# Patient Record
Sex: MALE | Race: BLACK OR AFRICAN AMERICAN | Employment: FULL TIME | URBAN - METROPOLITAN AREA
[De-identification: names, ages, dates, MRNs, and addresses within clinical notes are randomized per-mention and may not be internally consistent; named-entity substitution may affect disease eponyms.]

---

## 2017-07-12 ENCOUNTER — OFFICE VISIT (OUTPATIENT)
Dept: PRIMARY CARE CLINIC | Age: 67
End: 2017-07-12

## 2017-07-12 VITALS
BODY MASS INDEX: 31.95 KG/M2 | RESPIRATION RATE: 18 BRPM | WEIGHT: 249 LBS | OXYGEN SATURATION: 96 % | HEART RATE: 81 BPM | DIASTOLIC BLOOD PRESSURE: 67 MMHG | TEMPERATURE: 98.9 F | HEIGHT: 74 IN | SYSTOLIC BLOOD PRESSURE: 107 MMHG

## 2017-07-12 DIAGNOSIS — E78.2 MIXED HYPERLIPIDEMIA: ICD-10-CM

## 2017-07-12 DIAGNOSIS — B35.3 TINEA PEDIS, UNSPECIFIED LATERALITY: ICD-10-CM

## 2017-07-12 DIAGNOSIS — R94.5 LIVER FUNCTION STUDY, ABNORMAL: ICD-10-CM

## 2017-07-12 DIAGNOSIS — D23.9 DYSPLASTIC NEVI: ICD-10-CM

## 2017-07-12 DIAGNOSIS — R35.0 URINARY FREQUENCY: ICD-10-CM

## 2017-07-12 DIAGNOSIS — I10 ESSENTIAL HYPERTENSION: ICD-10-CM

## 2017-07-12 DIAGNOSIS — E11.8 TYPE 2 DIABETES MELLITUS WITH COMPLICATION, WITHOUT LONG-TERM CURRENT USE OF INSULIN (HCC): ICD-10-CM

## 2017-07-12 DIAGNOSIS — Z00.00 VISIT FOR PREVENTIVE HEALTH EXAMINATION: Primary | ICD-10-CM

## 2017-07-12 PROCEDURE — 3017F COLORECTAL CA SCREEN DOC REV: CPT | Performed by: INTERNAL MEDICINE

## 2017-07-12 PROCEDURE — 1123F ACP DISCUSS/DSCN MKR DOCD: CPT | Performed by: INTERNAL MEDICINE

## 2017-07-12 PROCEDURE — 3046F HEMOGLOBIN A1C LEVEL >9.0%: CPT | Performed by: INTERNAL MEDICINE

## 2017-07-12 PROCEDURE — G8427 DOCREV CUR MEDS BY ELIG CLIN: HCPCS | Performed by: INTERNAL MEDICINE

## 2017-07-12 PROCEDURE — 1036F TOBACCO NON-USER: CPT | Performed by: INTERNAL MEDICINE

## 2017-07-12 PROCEDURE — 4040F PNEUMOC VAC/ADMIN/RCVD: CPT | Performed by: INTERNAL MEDICINE

## 2017-07-12 PROCEDURE — G8417 CALC BMI ABV UP PARAM F/U: HCPCS | Performed by: INTERNAL MEDICINE

## 2017-07-12 PROCEDURE — G8598 ASA/ANTIPLAT THER USED: HCPCS | Performed by: INTERNAL MEDICINE

## 2017-07-12 PROCEDURE — 99387 INIT PM E/M NEW PAT 65+ YRS: CPT | Performed by: INTERNAL MEDICINE

## 2017-07-12 RX ORDER — LISINOPRIL 10 MG/1
10 TABLET ORAL DAILY
Qty: 90 TABLET | Refills: 3 | Status: SHIPPED | OUTPATIENT
Start: 2017-07-12

## 2017-07-12 RX ORDER — KETOCONAZOLE 20 MG/G
CREAM TOPICAL
Qty: 30 G | Refills: 5 | Status: SHIPPED | OUTPATIENT
Start: 2017-07-12 | End: 2017-08-25

## 2017-07-12 ASSESSMENT — PATIENT HEALTH QUESTIONNAIRE - PHQ9
1. LITTLE INTEREST OR PLEASURE IN DOING THINGS: 0
2. FEELING DOWN, DEPRESSED OR HOPELESS: 0
SUM OF ALL RESPONSES TO PHQ QUESTIONS 1-9: 0
SUM OF ALL RESPONSES TO PHQ9 QUESTIONS 1 & 2: 0

## 2017-07-12 ASSESSMENT — ENCOUNTER SYMPTOMS
APNEA: 0
SORE THROAT: 1
WHEEZING: 0
STRIDOR: 0
SINUS PRESSURE: 0
SHORTNESS OF BREATH: 0
COUGH: 0
CHOKING: 0
TROUBLE SWALLOWING: 0
CHEST TIGHTNESS: 0
ROS SKIN COMMENTS: MULTIPLE MOLES
VOICE CHANGE: 0

## 2017-08-25 ENCOUNTER — OFFICE VISIT (OUTPATIENT)
Dept: PRIMARY CARE CLINIC | Age: 67
End: 2017-08-25

## 2017-08-25 VITALS
HEIGHT: 74 IN | OXYGEN SATURATION: 94 % | WEIGHT: 259 LBS | SYSTOLIC BLOOD PRESSURE: 113 MMHG | HEART RATE: 74 BPM | DIASTOLIC BLOOD PRESSURE: 69 MMHG | BODY MASS INDEX: 33.24 KG/M2 | RESPIRATION RATE: 18 BRPM | TEMPERATURE: 97.9 F

## 2017-08-25 DIAGNOSIS — R74.8 LIVER ENZYME ELEVATION: ICD-10-CM

## 2017-08-25 DIAGNOSIS — E78.2 MIXED HYPERLIPIDEMIA: ICD-10-CM

## 2017-08-25 DIAGNOSIS — E55.9 VITAMIN D DEFICIENCY: Primary | ICD-10-CM

## 2017-08-25 PROCEDURE — G8427 DOCREV CUR MEDS BY ELIG CLIN: HCPCS | Performed by: INTERNAL MEDICINE

## 2017-08-25 PROCEDURE — 3017F COLORECTAL CA SCREEN DOC REV: CPT | Performed by: INTERNAL MEDICINE

## 2017-08-25 PROCEDURE — 1123F ACP DISCUSS/DSCN MKR DOCD: CPT | Performed by: INTERNAL MEDICINE

## 2017-08-25 PROCEDURE — 1036F TOBACCO NON-USER: CPT | Performed by: INTERNAL MEDICINE

## 2017-08-25 PROCEDURE — 4040F PNEUMOC VAC/ADMIN/RCVD: CPT | Performed by: INTERNAL MEDICINE

## 2017-08-25 PROCEDURE — G8417 CALC BMI ABV UP PARAM F/U: HCPCS | Performed by: INTERNAL MEDICINE

## 2017-08-25 PROCEDURE — G8598 ASA/ANTIPLAT THER USED: HCPCS | Performed by: INTERNAL MEDICINE

## 2017-08-25 PROCEDURE — 99213 OFFICE O/P EST LOW 20 MIN: CPT | Performed by: INTERNAL MEDICINE

## 2017-08-25 RX ORDER — ERGOCALCIFEROL 1.25 MG/1
50000 CAPSULE ORAL WEEKLY
Qty: 12 CAPSULE | Refills: 0 | COMMUNITY
Start: 2017-08-25 | End: 2018-01-11

## 2017-08-31 LAB
AVERAGE GLUCOSE: NORMAL
AVERAGE GLUCOSE: NORMAL
HBA1C MFR BLD: 7.1 %
HBA1C MFR BLD: 7.4 %

## 2017-09-05 ASSESSMENT — ENCOUNTER SYMPTOMS
STRIDOR: 0
APNEA: 0
WHEEZING: 0
CHEST TIGHTNESS: 0
ROS SKIN COMMENTS: MULTIPLE MOLES
TROUBLE SWALLOWING: 0
SINUS PRESSURE: 0
SHORTNESS OF BREATH: 0
CHOKING: 0
VOICE CHANGE: 0
COUGH: 0
SORE THROAT: 1

## 2017-12-08 ENCOUNTER — OFFICE VISIT (OUTPATIENT)
Dept: PRIMARY CARE CLINIC | Age: 67
End: 2017-12-08

## 2017-12-08 VITALS
HEART RATE: 82 BPM | WEIGHT: 284.2 LBS | BODY MASS INDEX: 36.49 KG/M2 | SYSTOLIC BLOOD PRESSURE: 127 MMHG | TEMPERATURE: 98.7 F | DIASTOLIC BLOOD PRESSURE: 82 MMHG | OXYGEN SATURATION: 95 %

## 2017-12-08 DIAGNOSIS — I10 ESSENTIAL HYPERTENSION: Primary | ICD-10-CM

## 2017-12-08 PROCEDURE — 99999 PR OFFICE/OUTPT VISIT,PROCEDURE ONLY: CPT | Performed by: INTERNAL MEDICINE

## 2017-12-08 RX ORDER — TAMSULOSIN HYDROCHLORIDE 0.4 MG/1
0.4 CAPSULE ORAL
COMMUNITY
Start: 2017-12-05 | End: 2017-12-08

## 2017-12-08 NOTE — PATIENT INSTRUCTIONS
keep your child on a regular schedule for other immunizations against diseases such as diphtheria, tetanus, pertussis (whooping cough), measles, mumps, hepatitis, or varicella (chicken pox). Your doctor or state health department can provide you with a recommended immunization schedule. What is pneumococcal 13-valent conjugate vaccine? Pneumococcal disease is a serious infection caused by a bacteria. Pneumococcal bacteria can infect the sinuses and inner ear. It can also infect the lungs, blood, and brain, and these conditions can be fatal.  Pneumococcal 13-valent vaccine is used to prevent infection caused by pneumococcal bacteria. This vaccine contains 13 different types of pneumococcal bacteria. Pneumococcal 13-valent vaccine works by exposing you to a small amount of the bacteria or a protein from the bacteria, which causes the body to develop immunity to the disease. This vaccine will not treat an active infection that has already developed in the body. Pneumococcal 13-valent vaccine is for use in children from 6 weeks to 11years old, and in adults who are 48 and older. Becoming infected with pneumococcal disease (such as pneumonia or meningitis) is much more dangerous to your health than receiving this vaccine. However, like any medicine, this vaccine can cause side effects but the risk of serious side effects is extremely low. Like any vaccine, pneumococcal 13-valent vaccine may not provide protection from disease in every person. What should I discuss with my healthcare provider before receiving this vaccine? Keep track of any and all side effects your child has after receiving this vaccine. When the child receives a booster dose, you will need to tell the doctor if the previous shot caused any side effects. You should not receive this vaccine if you ever had a severe allergic reaction to a pneumococcal or diphtheria vaccine.   Before your child receives this vaccine, tell your doctor if the child license by [facility] ([facility]). If you have questions about a medical condition or this instruction, always ask your healthcare professional. [place] [address] any warranty or liability for your use of this information.

## 2018-01-11 ENCOUNTER — OFFICE VISIT (OUTPATIENT)
Dept: PRIMARY CARE CLINIC | Age: 68
End: 2018-01-11

## 2018-01-11 VITALS
HEIGHT: 74 IN | WEIGHT: 287 LBS | DIASTOLIC BLOOD PRESSURE: 71 MMHG | SYSTOLIC BLOOD PRESSURE: 116 MMHG | HEART RATE: 82 BPM | OXYGEN SATURATION: 93 % | BODY MASS INDEX: 36.83 KG/M2

## 2018-01-11 DIAGNOSIS — R79.89 ELEVATED LIVER FUNCTION TESTS: ICD-10-CM

## 2018-01-11 DIAGNOSIS — E11.8 TYPE 2 DIABETES MELLITUS WITH COMPLICATION, WITHOUT LONG-TERM CURRENT USE OF INSULIN (HCC): ICD-10-CM

## 2018-01-11 DIAGNOSIS — E55.9 VITAMIN D DEFICIENCY: ICD-10-CM

## 2018-01-11 DIAGNOSIS — Z01.818 PRE-OP EXAM: Primary | ICD-10-CM

## 2018-01-11 PROCEDURE — 4040F PNEUMOC VAC/ADMIN/RCVD: CPT | Performed by: INTERNAL MEDICINE

## 2018-01-11 PROCEDURE — 99213 OFFICE O/P EST LOW 20 MIN: CPT | Performed by: INTERNAL MEDICINE

## 2018-01-11 PROCEDURE — G8484 FLU IMMUNIZE NO ADMIN: HCPCS | Performed by: INTERNAL MEDICINE

## 2018-01-11 PROCEDURE — 3017F COLORECTAL CA SCREEN DOC REV: CPT | Performed by: INTERNAL MEDICINE

## 2018-01-11 PROCEDURE — G8427 DOCREV CUR MEDS BY ELIG CLIN: HCPCS | Performed by: INTERNAL MEDICINE

## 2018-01-11 PROCEDURE — G8417 CALC BMI ABV UP PARAM F/U: HCPCS | Performed by: INTERNAL MEDICINE

## 2018-01-11 PROCEDURE — 93000 ELECTROCARDIOGRAM COMPLETE: CPT | Performed by: INTERNAL MEDICINE

## 2018-01-11 RX ORDER — FINASTERIDE 5 MG/1
5 TABLET, FILM COATED ORAL DAILY
Qty: 30 TABLET | Refills: 5 | COMMUNITY
Start: 2018-01-11

## 2018-01-11 ASSESSMENT — ENCOUNTER SYMPTOMS
TROUBLE SWALLOWING: 0
WHEEZING: 0
CHOKING: 0
SORE THROAT: 0
ROS SKIN COMMENTS: MULTIPLE MOLES
STRIDOR: 0
CHEST TIGHTNESS: 0
SINUS PRESSURE: 0
VOICE CHANGE: 0
SHORTNESS OF BREATH: 0
APNEA: 0
ALLERGIC/IMMUNOLOGIC NEGATIVE: 1
COUGH: 0

## 2018-01-11 ASSESSMENT — PATIENT HEALTH QUESTIONNAIRE - PHQ9
2. FEELING DOWN, DEPRESSED OR HOPELESS: 0
1. LITTLE INTEREST OR PLEASURE IN DOING THINGS: 0
SUM OF ALL RESPONSES TO PHQ QUESTIONS 1-9: 0
SUM OF ALL RESPONSES TO PHQ9 QUESTIONS 1 & 2: 0

## 2018-01-16 ENCOUNTER — TELEPHONE (OUTPATIENT)
Dept: PRIMARY CARE CLINIC | Age: 68
End: 2018-01-16

## 2018-01-16 NOTE — TELEPHONE ENCOUNTER
Left voice mail message with Dr. Lana Garcia nurse to ask DR. Eisenberg to return call regarding pre-op ekg.

## 2018-01-17 ENCOUNTER — TELEPHONE (OUTPATIENT)
Dept: PRIMARY CARE CLINIC | Age: 68
End: 2018-01-17

## 2018-02-08 ENCOUNTER — TELEPHONE (OUTPATIENT)
Dept: PRIMARY CARE CLINIC | Age: 68
End: 2018-02-08

## 2018-02-16 ENCOUNTER — OFFICE VISIT (OUTPATIENT)
Dept: PRIMARY CARE CLINIC | Age: 68
End: 2018-02-16

## 2018-02-16 VITALS
WEIGHT: 286.8 LBS | HEART RATE: 82 BPM | BODY MASS INDEX: 36.43 KG/M2 | TEMPERATURE: 97.5 F | SYSTOLIC BLOOD PRESSURE: 113 MMHG | DIASTOLIC BLOOD PRESSURE: 65 MMHG | OXYGEN SATURATION: 95 %

## 2018-02-16 DIAGNOSIS — N32.81 OAB (OVERACTIVE BLADDER): ICD-10-CM

## 2018-02-16 DIAGNOSIS — I25.9 ISCHEMIC HEART DISEASE: ICD-10-CM

## 2018-02-16 DIAGNOSIS — N40.1 BENIGN PROSTATIC HYPERPLASIA WITH WEAK URINARY STREAM: ICD-10-CM

## 2018-02-16 DIAGNOSIS — R39.12 BENIGN PROSTATIC HYPERPLASIA WITH WEAK URINARY STREAM: ICD-10-CM

## 2018-02-16 DIAGNOSIS — Z01.818 PRE-OP EXAM: Primary | ICD-10-CM

## 2018-02-16 DIAGNOSIS — Z87.448 H/O URETHRAL STRICTURE: ICD-10-CM

## 2018-02-16 DIAGNOSIS — I10 ESSENTIAL HYPERTENSION: ICD-10-CM

## 2018-02-16 LAB — HBA1C MFR BLD: 7.8 %

## 2018-02-16 PROCEDURE — 3017F COLORECTAL CA SCREEN DOC REV: CPT | Performed by: FAMILY MEDICINE

## 2018-02-16 PROCEDURE — G8484 FLU IMMUNIZE NO ADMIN: HCPCS | Performed by: FAMILY MEDICINE

## 2018-02-16 PROCEDURE — G8417 CALC BMI ABV UP PARAM F/U: HCPCS | Performed by: FAMILY MEDICINE

## 2018-02-16 PROCEDURE — 4040F PNEUMOC VAC/ADMIN/RCVD: CPT | Performed by: FAMILY MEDICINE

## 2018-02-16 PROCEDURE — 99242 OFF/OP CONSLTJ NEW/EST SF 20: CPT | Performed by: FAMILY MEDICINE

## 2018-02-16 PROCEDURE — G8427 DOCREV CUR MEDS BY ELIG CLIN: HCPCS | Performed by: FAMILY MEDICINE

## 2018-02-16 PROCEDURE — 83036 HEMOGLOBIN GLYCOSYLATED A1C: CPT | Performed by: FAMILY MEDICINE

## 2018-02-16 ASSESSMENT — ENCOUNTER SYMPTOMS
ANAL BLEEDING: 0
CONSTIPATION: 0
WHEEZING: 0
COUGH: 0
NAUSEA: 0
EYE DISCHARGE: 0
BLOOD IN STOOL: 0
CHOKING: 0
APNEA: 0
BACK PAIN: 0
CHEST TIGHTNESS: 0
RECTAL PAIN: 0
SHORTNESS OF BREATH: 0
VOICE CHANGE: 0
ABDOMINAL PAIN: 0
TROUBLE SWALLOWING: 0
COLOR CHANGE: 0
PHOTOPHOBIA: 0
SORE THROAT: 0
VOMITING: 0
EYE ITCHING: 0
SINUS PRESSURE: 0
FACIAL SWELLING: 0
EYE REDNESS: 0
EYE PAIN: 0

## 2018-02-16 NOTE — PROGRESS NOTES
(Severe) Mother     Diabetes Father     Kidney Disease Father     Kidney Disease Brother     Diabetes Brother           Current Outpatient Prescriptions   Medication Sig Dispense Refill    metFORMIN (GLUCOPHAGE) 500 MG tablet Take 2 tablets by mouth 2 times daily (with meals) 120 tablet 5    Cholecalciferol (VITAMIN D3) 1000 units CAPS Take 2,000 Units by mouth daily 60 capsule 5    linagliptin (TRADJENTA) 5 MG tablet Take 1 tablet by mouth daily 30 tablet 5    finasteride (PROSCAR) 5 MG tablet Take 1 tablet by mouth daily 30 tablet 5    alirocumab (PRALUENT) 75 MG/ML SOSY injection prefilled syringe Inject 1 mL into the skin every 14 days 2.1 mL 5    lisinopril (PRINIVIL;ZESTRIL) 10 MG tablet Take 1 tablet by mouth daily 90 tablet 3    carvedilol (COREG) 12.5 MG tablet Take 12.5 mg by mouth 2 times daily (with meals).  tamsulosin (FLOMAX) 0.4 MG capsule Take 0.4 mg by mouth daily.  aspirin 81 MG tablet Take 81 mg by mouth daily.  therapeutic multivitamin-minerals (THERAGRAN-M) tablet Take 1 tablet by mouth daily. No current facility-administered medications for this visit. Review of Systems   Constitutional: Negative for activity change, appetite change, chills, diaphoresis, fatigue, fever and unexpected weight change. HENT: Negative for congestion, dental problem, drooling, ear discharge, ear pain, facial swelling, hearing loss, mouth sores, nosebleeds, postnasal drip, sinus pressure, sneezing, sore throat, tinnitus, trouble swallowing and voice change. Eyes: Negative for photophobia, pain, discharge, redness, itching and visual disturbance. Respiratory: Negative for apnea, cough, choking, chest tightness, shortness of breath and wheezing. Cardiovascular: Negative for chest pain, palpitations and leg swelling. Gastrointestinal: Negative for abdominal pain, anal bleeding, blood in stool, constipation, nausea, rectal pain and vomiting.    Genitourinary:

## 2018-02-28 DIAGNOSIS — R79.89 ELEVATED LIVER FUNCTION TESTS: Primary | ICD-10-CM

## 2018-09-26 LAB — DIABETIC RETINOPATHY: NEGATIVE

## 2018-10-19 ENCOUNTER — OFFICE VISIT (OUTPATIENT)
Dept: PRIMARY CARE CLINIC | Age: 68
End: 2018-10-19
Payer: COMMERCIAL

## 2018-10-19 VITALS
RESPIRATION RATE: 18 BRPM | SYSTOLIC BLOOD PRESSURE: 129 MMHG | OXYGEN SATURATION: 95 % | HEART RATE: 78 BPM | DIASTOLIC BLOOD PRESSURE: 78 MMHG | WEIGHT: 287 LBS | TEMPERATURE: 97.3 F | BODY MASS INDEX: 36.45 KG/M2

## 2018-10-19 DIAGNOSIS — M79.89 LEFT ARM SWELLING: ICD-10-CM

## 2018-10-19 DIAGNOSIS — M79.642 LEFT HAND PAIN: Primary | ICD-10-CM

## 2018-10-19 DIAGNOSIS — R94.131 EMG (ELECTROMYOGRAM) ABNORMALITIES: ICD-10-CM

## 2018-10-19 DIAGNOSIS — M25.532 LEFT WRIST PAIN: ICD-10-CM

## 2018-10-19 PROCEDURE — 1123F ACP DISCUSS/DSCN MKR DOCD: CPT | Performed by: INTERNAL MEDICINE

## 2018-10-19 PROCEDURE — 1036F TOBACCO NON-USER: CPT | Performed by: INTERNAL MEDICINE

## 2018-10-19 PROCEDURE — G8427 DOCREV CUR MEDS BY ELIG CLIN: HCPCS | Performed by: INTERNAL MEDICINE

## 2018-10-19 PROCEDURE — G8598 ASA/ANTIPLAT THER USED: HCPCS | Performed by: INTERNAL MEDICINE

## 2018-10-19 PROCEDURE — 4040F PNEUMOC VAC/ADMIN/RCVD: CPT | Performed by: INTERNAL MEDICINE

## 2018-10-19 PROCEDURE — 3017F COLORECTAL CA SCREEN DOC REV: CPT | Performed by: INTERNAL MEDICINE

## 2018-10-19 PROCEDURE — 1101F PT FALLS ASSESS-DOCD LE1/YR: CPT | Performed by: INTERNAL MEDICINE

## 2018-10-19 PROCEDURE — 99213 OFFICE O/P EST LOW 20 MIN: CPT | Performed by: INTERNAL MEDICINE

## 2018-10-19 PROCEDURE — G8417 CALC BMI ABV UP PARAM F/U: HCPCS | Performed by: INTERNAL MEDICINE

## 2018-10-19 PROCEDURE — G8482 FLU IMMUNIZE ORDER/ADMIN: HCPCS | Performed by: INTERNAL MEDICINE

## 2018-10-19 RX ORDER — TRAMADOL HYDROCHLORIDE 50 MG/1
50 TABLET ORAL EVERY 6 HOURS PRN
Qty: 7 TABLET | Refills: 1 | Status: SHIPPED | OUTPATIENT
Start: 2018-10-19 | End: 2018-10-29

## 2018-10-19 RX ORDER — LISINOPRIL 10 MG/1
TABLET ORAL
COMMUNITY
Start: 2018-07-30 | End: 2019-10-30 | Stop reason: SDUPTHER

## 2018-10-19 RX ORDER — COLCHICINE 0.6 MG/1
0.6 TABLET ORAL DAILY
Qty: 7 TABLET | Refills: 0 | Status: SHIPPED | OUTPATIENT
Start: 2018-10-19 | End: 2018-11-05 | Stop reason: SDUPTHER

## 2018-10-19 NOTE — PROGRESS NOTES
10/19/2018     Booker Shaffer (:  1950) is a 70 y.o. male, here for evaluation of the following medical concerns:    Hand Pain    Incident onset: started 3 months ago in all finger joints. There was no injury mechanism. The pain is present in the left hand. The quality of the pain is described as aching and burning (tingling). The pain does not radiate (only the four fingers and the thumb feels good. ). The pain is at a severity of 7/10. The pain is severe. The pain has been constant since the incident. Associated symptoms include muscle weakness, numbness and tingling. Pertinent negatives include no chest pain. The symptoms are aggravated by movement and palpation. He has tried ice, heat and acetaminophen for the symptoms. The treatment provided mild relief.    LIPID [facility] [address]  Component Name Value Ref Range   Cholesterol 121 (L)   Comment:  TOTAL CHOLESTEROL INTERPRETATION:  Less than 200 mg/dL Desireable  200-239 mg/dL Borderline  Greater or Equal to 240 mg/dL High 125 - 199 mg/dL   LDL Calculated 63   Comment:  LDL CHOLESTEROL INTERPRETATION:    Less than 100 mg/dL Optimal  100-129 mg/dL Near optimal/above optimal  130-159 mg/dL Borderline High  160-189 mg/dL High  Greater or Equal to 190 mg/dL Very High 0 - 100 mg/dL   HDL 37 (L)   Comment:  HDL CHOLESTEROL INTERPRETATION:    Less than 40 mg/dL Low  Greater than 60 mg/dL Desirable 40 - 180 mg/dL   Triglycerides 104   Comment:  TOTAL TRIGLYCERIDE INTERPRETATION:    Less than 150 mg/dL Normal  150-199 mg/dL Borderline HIgh  200-499 mg/dL High  Greater or Equal to 500 mg/dL Very High 0 - 150 mg/dL     Lab Results  - in this encounter      POCT AMB DCA HEMOGLOBIN A1C  POCT AMB DCA HEMOGLOBIN A1C   Component Value Ref Range Performed At   Hgb A1C 7.8 % [facility]     POCT AMB DCA HEMOGLOBIN A1C   Performing Organization Address           Patient Active Problem List   Diagnosis    Type 2 diabetes mellitus with complication, Neck: Normal range of motion. Neck supple. No JVD present. No tracheal deviation present. No thyromegaly present. Cardiovascular: Normal rate, regular rhythm, normal heart sounds and intact distal pulses. Exam reveals no gallop. No murmur heard. Pulmonary/Chest: Effort normal and breath sounds normal. No respiratory distress. He has no wheezes. He has no rales. He exhibits no tenderness. Abdominal: Soft. He exhibits no distension and no mass. There is no tenderness. There is no rebound and no guarding. Musculoskeletal: Normal range of motion. He exhibits no edema or tenderness. Swelling of left wrist and mcp joints of the hand. Painful with palpation and flexion and extension of the hand. Lymphadenopathy:     He has no cervical adenopathy. Neurological: He is alert and oriented to person, place, and time. He displays normal reflexes. No cranial nerve deficit. He exhibits normal muscle tone. Coordination normal.   Skin: Skin is warm. No rash noted. He is not diaphoretic. No erythema. No pallor. Lipomatous mass   Psychiatric: He has a normal mood and affect. His behavior is normal. Thought content normal.       ASSESSMENT/PLAN:   Diagnosis Orders   1. Left hand pain and tenosynovitis consistent with arthritis. Rule out inflammatory arthritis with no history of trauma. XR HAND LEFT (2 VIEWS)    EMG    RHEUMATOID FACTOR    Uric Acid    DERIAN    SEDIMENTATION RATE    colchicine (COLCRYS) 0.6 MG tablet    traMADol (ULTRAM) 50 MG tablet   2. Left wrist pain , same as #1. XR WRIST LEFT (MIN 3 VIEWS)    colchicine (COLCRYS) 0.6 MG tablet    traMADol (ULTRAM) 50 MG tablet        3. Left arm swelling consistent with new onset lymphedema verses venous occlusion.   RENAL FUNCTION PANEL    VL DUP UPPER EXTREMITY VENOUS LEFT    External Referral To Orthopedic Surgery    traMADol (ULTRAM) 50 MG tablet   Cristobal Hale received counseling on the following healthy behaviors: medication adherence    Patient given

## 2018-10-23 ASSESSMENT — ENCOUNTER SYMPTOMS
ALLERGIC/IMMUNOLOGIC NEGATIVE: 1
VOICE CHANGE: 0
STRIDOR: 0
CHOKING: 0
SINUS PRESSURE: 0
CHEST TIGHTNESS: 0
TROUBLE SWALLOWING: 0
ROS SKIN COMMENTS: MULTIPLE MOLES
COUGH: 0
WHEEZING: 0
SHORTNESS OF BREATH: 0
APNEA: 0
SORE THROAT: 0

## 2018-10-26 ENCOUNTER — TELEPHONE (OUTPATIENT)
Dept: PRIMARY CARE CLINIC | Age: 68
End: 2018-10-26

## 2018-11-05 ENCOUNTER — OFFICE VISIT (OUTPATIENT)
Dept: PRIMARY CARE CLINIC | Age: 68
End: 2018-11-05
Payer: COMMERCIAL

## 2018-11-05 VITALS
TEMPERATURE: 97.5 F | RESPIRATION RATE: 18 BRPM | DIASTOLIC BLOOD PRESSURE: 70 MMHG | SYSTOLIC BLOOD PRESSURE: 128 MMHG | WEIGHT: 287 LBS | OXYGEN SATURATION: 96 % | BODY MASS INDEX: 36.45 KG/M2 | HEART RATE: 77 BPM

## 2018-11-05 DIAGNOSIS — M79.642 LEFT HAND PAIN: ICD-10-CM

## 2018-11-05 DIAGNOSIS — G56.02 SEVERE CARPAL TUNNEL SYNDROME OF LEFT WRIST: ICD-10-CM

## 2018-11-05 DIAGNOSIS — M25.532 LEFT WRIST PAIN: ICD-10-CM

## 2018-11-05 DIAGNOSIS — E79.0 HYPERURICEMIA: ICD-10-CM

## 2018-11-05 DIAGNOSIS — E11.8 TYPE 2 DIABETES MELLITUS WITH COMPLICATION, WITHOUT LONG-TERM CURRENT USE OF INSULIN (HCC): ICD-10-CM

## 2018-11-05 DIAGNOSIS — I10 ESSENTIAL HYPERTENSION: Primary | ICD-10-CM

## 2018-11-05 PROCEDURE — 99214 OFFICE O/P EST MOD 30 MIN: CPT | Performed by: INTERNAL MEDICINE

## 2018-11-05 PROCEDURE — 1123F ACP DISCUSS/DSCN MKR DOCD: CPT | Performed by: INTERNAL MEDICINE

## 2018-11-05 PROCEDURE — 1036F TOBACCO NON-USER: CPT | Performed by: INTERNAL MEDICINE

## 2018-11-05 PROCEDURE — G8427 DOCREV CUR MEDS BY ELIG CLIN: HCPCS | Performed by: INTERNAL MEDICINE

## 2018-11-05 PROCEDURE — 1101F PT FALLS ASSESS-DOCD LE1/YR: CPT | Performed by: INTERNAL MEDICINE

## 2018-11-05 PROCEDURE — 3045F PR MOST RECENT HEMOGLOBIN A1C LEVEL 7.0-9.0%: CPT | Performed by: INTERNAL MEDICINE

## 2018-11-05 PROCEDURE — G8598 ASA/ANTIPLAT THER USED: HCPCS | Performed by: INTERNAL MEDICINE

## 2018-11-05 PROCEDURE — G8417 CALC BMI ABV UP PARAM F/U: HCPCS | Performed by: INTERNAL MEDICINE

## 2018-11-05 PROCEDURE — G8482 FLU IMMUNIZE ORDER/ADMIN: HCPCS | Performed by: INTERNAL MEDICINE

## 2018-11-05 PROCEDURE — 2022F DILAT RTA XM EVC RTNOPTHY: CPT | Performed by: INTERNAL MEDICINE

## 2018-11-05 PROCEDURE — 4040F PNEUMOC VAC/ADMIN/RCVD: CPT | Performed by: INTERNAL MEDICINE

## 2018-11-05 PROCEDURE — 3017F COLORECTAL CA SCREEN DOC REV: CPT | Performed by: INTERNAL MEDICINE

## 2018-11-05 RX ORDER — COLCHICINE 0.6 MG/1
0.6 TABLET ORAL DAILY PRN
Qty: 30 TABLET | Refills: 2 | Status: SHIPPED | OUTPATIENT
Start: 2018-11-05 | End: 2020-10-06 | Stop reason: SDUPTHER

## 2018-11-05 ASSESSMENT — ENCOUNTER SYMPTOMS
WHEEZING: 0
ALLERGIC/IMMUNOLOGIC NEGATIVE: 1
CHEST TIGHTNESS: 0
COUGH: 0
SHORTNESS OF BREATH: 0
VOICE CHANGE: 0
SINUS PRESSURE: 0
APNEA: 0
ROS SKIN COMMENTS: MULTIPLE MOLES
STRIDOR: 0
SORE THROAT: 0
TROUBLE SWALLOWING: 0
CHOKING: 0

## 2018-11-05 NOTE — LETTER
[facility]  [address] [id number]  Phone: [phone]  Fax: [phone]    Marylene Banas, MD        November 11, 2018     [address]      Dear Jeb Giles:    Below are the results from your recent visit:    Uric acid level at [facility] on 10/22/80 was elevated at 7.4. Our goal level is below 6. Elevated uric acid can cause gout arthritis pain. Sitting prescription for allopurinol 100 mg twice a day to your pharmacy to lower your uric acid level. After you've been on this 2 weeks you can stop the colchicine and only take that as needed for pain. The purpose of the allopurinol is to keep the uric acid level will see again any flareups of pain. Let me know when you receive the letter so I can send a prescription to your pharmacy    If you have any questions or concerns, please don't hesitate to call.     Sincerely,          Marylene Banas, MD

## 2018-11-05 NOTE — PROGRESS NOTES
stable deformity of the distal phalanx left long finger. 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 No acute bony abnormality. Signed By: Felipe Rosen MD     Comparisons: 2/15/2010. FINDINGS:    3 projections of the left wrist. There is osteoarthrosis of the radiocarpal joint. This includes the distal radioulnar joint. This is similar to prior study from 2010. IMPRESSION:  1. Mild osteoarthrosis of the left wrist as described. No fracture identified. Left hand series on 10/22/2018:    CLINICAL HISTORY: Left wrist pain. Left hand pain. Comparisons: 2/15/2010. FINDINGS:    2 projections of the left hand. There is deformity of the distal phalanx tuft of the left long finger. On the prior study from 2/15/2010, there was an acute fracture seen in this area. Current images demonstrate some persistent deformity probably representing sequela of that prior, remote trauma. No definite acute bony abnormality is identified. There is osteoarthrosis of the DIP joints of the index, long and ring fingers. IMPRESSION:  1. Sequela of remote trauma with stable deformity of the distal phalanx left long finger. 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 No acute bony abnormality. Signed By: Georgie Rosen MD   Status       Care Everywhere Result Report  ANTI-NUCLEAR AB10/22/2018  [facility]  Component Name Value Ref Range   DERIAN NEGATIVE   Comment:  DERIAN IFA is a first line screen for detecting the  presence of up to approximately 150 autoantibodies in  various autoimmune diseases. A negative DERIAN IFA result  suggests DERIAN-associated autoimmune diseases are not  present at this time. Visit Physician FAQs for interpretation of all  antibodies in the [place], prevalence, and association  with diseases at http://education. "[employer]. com/  D655707    Test performed at [facility] [address]  [address] [facility], [address] [facility]

## 2018-11-19 ENCOUNTER — TELEPHONE (OUTPATIENT)
Dept: PRIMARY CARE CLINIC | Age: 68
End: 2018-11-19

## 2018-11-20 DIAGNOSIS — M10.9 GOUTY ARTHRITIS: Primary | ICD-10-CM

## 2018-11-20 RX ORDER — ALLOPURINOL 100 MG/1
100 TABLET ORAL 2 TIMES DAILY
Qty: 60 TABLET | Refills: 5 | Status: SHIPPED | OUTPATIENT
Start: 2018-11-20 | End: 2019-10-30 | Stop reason: SDUPTHER

## 2019-02-13 ENCOUNTER — TELEPHONE (OUTPATIENT)
Dept: PRIMARY CARE CLINIC | Age: 69
End: 2019-02-13

## 2019-02-19 ENCOUNTER — OFFICE VISIT (OUTPATIENT)
Dept: PRIMARY CARE CLINIC | Age: 69
End: 2019-02-19
Payer: COMMERCIAL

## 2019-02-19 VITALS
HEART RATE: 77 BPM | WEIGHT: 293.6 LBS | SYSTOLIC BLOOD PRESSURE: 125 MMHG | OXYGEN SATURATION: 98 % | TEMPERATURE: 97.2 F | HEIGHT: 74 IN | BODY MASS INDEX: 37.68 KG/M2 | DIASTOLIC BLOOD PRESSURE: 78 MMHG

## 2019-02-19 DIAGNOSIS — G56.02 SEVERE CARPAL TUNNEL SYNDROME OF LEFT WRIST: ICD-10-CM

## 2019-02-19 DIAGNOSIS — Z01.818 PRE-OP EXAM: Primary | ICD-10-CM

## 2019-02-19 DIAGNOSIS — I10 ESSENTIAL HYPERTENSION: ICD-10-CM

## 2019-02-19 PROCEDURE — G8482 FLU IMMUNIZE ORDER/ADMIN: HCPCS | Performed by: INTERNAL MEDICINE

## 2019-02-19 PROCEDURE — G8427 DOCREV CUR MEDS BY ELIG CLIN: HCPCS | Performed by: INTERNAL MEDICINE

## 2019-02-19 PROCEDURE — 93000 ELECTROCARDIOGRAM COMPLETE: CPT | Performed by: INTERNAL MEDICINE

## 2019-02-19 PROCEDURE — G8417 CALC BMI ABV UP PARAM F/U: HCPCS | Performed by: INTERNAL MEDICINE

## 2019-02-19 PROCEDURE — 1101F PT FALLS ASSESS-DOCD LE1/YR: CPT | Performed by: INTERNAL MEDICINE

## 2019-02-19 PROCEDURE — 99215 OFFICE O/P EST HI 40 MIN: CPT | Performed by: INTERNAL MEDICINE

## 2019-02-19 PROCEDURE — 3017F COLORECTAL CA SCREEN DOC REV: CPT | Performed by: INTERNAL MEDICINE

## 2019-02-19 ASSESSMENT — ENCOUNTER SYMPTOMS
EYE DISCHARGE: 0
RESPIRATORY NEGATIVE: 1
EYES NEGATIVE: 1

## 2019-02-19 ASSESSMENT — PATIENT HEALTH QUESTIONNAIRE - PHQ9
SUM OF ALL RESPONSES TO PHQ9 QUESTIONS 1 & 2: 0
2. FEELING DOWN, DEPRESSED OR HOPELESS: 0
SUM OF ALL RESPONSES TO PHQ QUESTIONS 1-9: 0
1. LITTLE INTEREST OR PLEASURE IN DOING THINGS: 0
SUM OF ALL RESPONSES TO PHQ QUESTIONS 1-9: 0

## 2019-03-21 PROBLEM — Z01.818 PRE-OP EXAM: Status: RESOLVED | Noted: 2019-02-19 | Resolved: 2019-03-21

## 2019-07-31 ENCOUNTER — OFFICE VISIT (OUTPATIENT)
Dept: PRIMARY CARE CLINIC | Age: 69
End: 2019-07-31
Payer: COMMERCIAL

## 2019-07-31 VITALS
HEART RATE: 78 BPM | HEIGHT: 75 IN | TEMPERATURE: 97.8 F | BODY MASS INDEX: 35.06 KG/M2 | DIASTOLIC BLOOD PRESSURE: 76 MMHG | SYSTOLIC BLOOD PRESSURE: 128 MMHG | WEIGHT: 282 LBS | OXYGEN SATURATION: 95 % | RESPIRATION RATE: 16 BRPM

## 2019-07-31 DIAGNOSIS — E11.8 TYPE 2 DIABETES MELLITUS WITH COMPLICATION, WITHOUT LONG-TERM CURRENT USE OF INSULIN (HCC): ICD-10-CM

## 2019-07-31 DIAGNOSIS — I10 ESSENTIAL HYPERTENSION: Primary | ICD-10-CM

## 2019-07-31 DIAGNOSIS — E55.9 VITAMIN D DEFICIENCY: ICD-10-CM

## 2019-07-31 DIAGNOSIS — E78.2 MIXED HYPERLIPIDEMIA: ICD-10-CM

## 2019-07-31 PROCEDURE — 1123F ACP DISCUSS/DSCN MKR DOCD: CPT | Performed by: INTERNAL MEDICINE

## 2019-07-31 PROCEDURE — 4040F PNEUMOC VAC/ADMIN/RCVD: CPT | Performed by: INTERNAL MEDICINE

## 2019-07-31 PROCEDURE — G8598 ASA/ANTIPLAT THER USED: HCPCS | Performed by: INTERNAL MEDICINE

## 2019-07-31 PROCEDURE — G8427 DOCREV CUR MEDS BY ELIG CLIN: HCPCS | Performed by: INTERNAL MEDICINE

## 2019-07-31 PROCEDURE — 1036F TOBACCO NON-USER: CPT | Performed by: INTERNAL MEDICINE

## 2019-07-31 PROCEDURE — 3046F HEMOGLOBIN A1C LEVEL >9.0%: CPT | Performed by: INTERNAL MEDICINE

## 2019-07-31 PROCEDURE — G8417 CALC BMI ABV UP PARAM F/U: HCPCS | Performed by: INTERNAL MEDICINE

## 2019-07-31 PROCEDURE — 99214 OFFICE O/P EST MOD 30 MIN: CPT | Performed by: INTERNAL MEDICINE

## 2019-07-31 PROCEDURE — 3017F COLORECTAL CA SCREEN DOC REV: CPT | Performed by: INTERNAL MEDICINE

## 2019-07-31 PROCEDURE — 2022F DILAT RTA XM EVC RTNOPTHY: CPT | Performed by: INTERNAL MEDICINE

## 2019-07-31 ASSESSMENT — ENCOUNTER SYMPTOMS
WHEEZING: 0
STRIDOR: 0
CHOKING: 0
COUGH: 0
SORE THROAT: 0
ALLERGIC/IMMUNOLOGIC NEGATIVE: 1
SHORTNESS OF BREATH: 0
ROS SKIN COMMENTS: MULTIPLE MOLES
SINUS PRESSURE: 0
APNEA: 0
CHEST TIGHTNESS: 0
VOICE CHANGE: 0
TROUBLE SWALLOWING: 0

## 2019-07-31 ASSESSMENT — PATIENT HEALTH QUESTIONNAIRE - PHQ9
2. FEELING DOWN, DEPRESSED OR HOPELESS: 0
1. LITTLE INTEREST OR PLEASURE IN DOING THINGS: 0
SUM OF ALL RESPONSES TO PHQ QUESTIONS 1-9: 0
SUM OF ALL RESPONSES TO PHQ QUESTIONS 1-9: 0
SUM OF ALL RESPONSES TO PHQ9 QUESTIONS 1 & 2: 0

## 2019-07-31 NOTE — PROGRESS NOTES
2019     Shawn Singh (:  1950) is a 78 y.o. male, here for evaluation of the following medical concerns:    Diabetes   He presents for his follow-up diabetic visit. He has type 2 diabetes mellitus. His disease course has been stable. There are no hypoglycemic associated symptoms. Pertinent negatives for hypoglycemia include no dizziness, headaches, seizures, speech difficulty or tremors. There are no diabetic associated symptoms. Pertinent negatives for diabetes include no chest pain, no polydipsia, no polyphagia, no polyuria and no weakness. There are no hypoglycemic complications. Symptoms are stable. Pertinent negatives for diabetic complications include no CVA, impotence, nephropathy or peripheral neuropathy. Risk factors for coronary artery disease include dyslipidemia and hypertension. Current diabetic treatments: a1c went u p to8.9 and ozempic and now fasting glucose 100 to 20 , prior was 150-180. He is compliant with treatment most of the time. He is following a generally healthy diet. He participates in exercise intermittently. An ACE inhibitor/angiotensin II receptor blocker is being taken. Eye exam is not current (instructed ot make an appointment. ). Care Everywhere Result Report  PSA (PROSTATE SPECIFIC AG)Last Updated: 2018  [facility]  Component Name Value Ref Range   PSA 0.2   Comment:  Total PSA is performed using the Abbott  chemiluminescent immunoassay method.   Values obtained from   different assay methods cannot be used interchangeably.  PSA levels, regardless of value, should not be used as   absolute evidence of the presence or absence of disease.  0.0 - 4.0 ng/mL   Specimen Collected on     Patient Active Problem List   Diagnosis    Type 2 diabetes mellitus with complication, without long-term current use of insulin ([facility] [address]    CAD (coronary artery disease)    Polyuria    Hyperlipidemia    Essential hypertension    Severe carpal tunnel syndrome of

## 2019-08-05 DIAGNOSIS — R74.8 LIVER ENZYME ELEVATION: Primary | ICD-10-CM

## 2019-08-20 ENCOUNTER — TELEPHONE (OUTPATIENT)
Dept: PRIMARY CARE CLINIC | Age: 69
End: 2019-08-20

## 2019-08-22 DIAGNOSIS — K62.9 ABNORMALITY OF RECTUM: Primary | ICD-10-CM

## 2019-08-22 DIAGNOSIS — Q44.6 CYSTIC DISEASE OF LIVER: ICD-10-CM

## 2019-08-22 DIAGNOSIS — K76.0 FATTY LIVER DISEASE, NONALCOHOLIC: ICD-10-CM

## 2019-10-30 ENCOUNTER — OFFICE VISIT (OUTPATIENT)
Dept: PRIMARY CARE CLINIC | Age: 69
End: 2019-10-30
Payer: COMMERCIAL

## 2019-10-30 VITALS
HEART RATE: 84 BPM | DIASTOLIC BLOOD PRESSURE: 77 MMHG | WEIGHT: 295 LBS | TEMPERATURE: 97 F | SYSTOLIC BLOOD PRESSURE: 117 MMHG | OXYGEN SATURATION: 94 % | BODY MASS INDEX: 37.37 KG/M2

## 2019-10-30 DIAGNOSIS — E78.2 MIXED HYPERLIPIDEMIA: ICD-10-CM

## 2019-10-30 DIAGNOSIS — M10.9 GOUTY ARTHRITIS: ICD-10-CM

## 2019-10-30 DIAGNOSIS — I10 ESSENTIAL HYPERTENSION: ICD-10-CM

## 2019-10-30 DIAGNOSIS — E11.8 TYPE 2 DIABETES MELLITUS WITH COMPLICATION, WITHOUT LONG-TERM CURRENT USE OF INSULIN (HCC): Primary | ICD-10-CM

## 2019-10-30 PROCEDURE — 99214 OFFICE O/P EST MOD 30 MIN: CPT | Performed by: INTERNAL MEDICINE

## 2019-10-30 PROCEDURE — 3017F COLORECTAL CA SCREEN DOC REV: CPT | Performed by: INTERNAL MEDICINE

## 2019-10-30 PROCEDURE — 2022F DILAT RTA XM EVC RTNOPTHY: CPT | Performed by: INTERNAL MEDICINE

## 2019-10-30 PROCEDURE — G8417 CALC BMI ABV UP PARAM F/U: HCPCS | Performed by: INTERNAL MEDICINE

## 2019-10-30 PROCEDURE — G8598 ASA/ANTIPLAT THER USED: HCPCS | Performed by: INTERNAL MEDICINE

## 2019-10-30 PROCEDURE — 1123F ACP DISCUSS/DSCN MKR DOCD: CPT | Performed by: INTERNAL MEDICINE

## 2019-10-30 PROCEDURE — 1036F TOBACCO NON-USER: CPT | Performed by: INTERNAL MEDICINE

## 2019-10-30 PROCEDURE — 4040F PNEUMOC VAC/ADMIN/RCVD: CPT | Performed by: INTERNAL MEDICINE

## 2019-10-30 PROCEDURE — G8482 FLU IMMUNIZE ORDER/ADMIN: HCPCS | Performed by: INTERNAL MEDICINE

## 2019-10-30 PROCEDURE — 3046F HEMOGLOBIN A1C LEVEL >9.0%: CPT | Performed by: INTERNAL MEDICINE

## 2019-10-30 PROCEDURE — G8427 DOCREV CUR MEDS BY ELIG CLIN: HCPCS | Performed by: INTERNAL MEDICINE

## 2019-10-30 RX ORDER — TADALAFIL 5 MG/1
1 TABLET ORAL DAILY PRN
COMMUNITY
Start: 2019-10-20

## 2019-10-30 RX ORDER — ALLOPURINOL 100 MG/1
100 TABLET ORAL 2 TIMES DAILY
Qty: 60 TABLET | Refills: 5 | Status: SHIPPED | OUTPATIENT
Start: 2019-10-30 | End: 2019-10-30 | Stop reason: SDUPTHER

## 2019-10-30 RX ORDER — ALLOPURINOL 100 MG/1
100 TABLET ORAL 2 TIMES DAILY
Qty: 160 TABLET | Refills: 3 | Status: SHIPPED | OUTPATIENT
Start: 2019-10-30

## 2019-10-30 RX ORDER — SEMAGLUTIDE 1.34 MG/ML
INJECTION, SOLUTION SUBCUTANEOUS WEEKLY
COMMUNITY
Start: 2019-10-10

## 2019-10-31 ASSESSMENT — PATIENT HEALTH QUESTIONNAIRE - PHQ9
2. FEELING DOWN, DEPRESSED OR HOPELESS: 0
1. LITTLE INTEREST OR PLEASURE IN DOING THINGS: 0
SUM OF ALL RESPONSES TO PHQ9 QUESTIONS 1 & 2: 0
SUM OF ALL RESPONSES TO PHQ QUESTIONS 1-9: 0
SUM OF ALL RESPONSES TO PHQ QUESTIONS 1-9: 0

## 2019-10-31 ASSESSMENT — ENCOUNTER SYMPTOMS
VOICE CHANGE: 0
BLURRED VISION: 0
CHOKING: 0
APNEA: 0
ALLERGIC/IMMUNOLOGIC NEGATIVE: 1
COUGH: 0
VISUAL CHANGE: 0
WHEEZING: 0
CHEST TIGHTNESS: 0
SHORTNESS OF BREATH: 0
STRIDOR: 0
TROUBLE SWALLOWING: 0
SORE THROAT: 0
ROS SKIN COMMENTS: MULTIPLE MOLES
SINUS PRESSURE: 0
ORTHOPNEA: 0

## 2020-01-20 LAB
AVERAGE GLUCOSE: NORMAL
HBA1C MFR BLD: 8.1 %

## 2020-02-07 ENCOUNTER — OFFICE VISIT (OUTPATIENT)
Dept: PRIMARY CARE CLINIC | Age: 70
End: 2020-02-07
Payer: COMMERCIAL

## 2020-02-07 VITALS
TEMPERATURE: 97.5 F | HEIGHT: 74 IN | SYSTOLIC BLOOD PRESSURE: 132 MMHG | DIASTOLIC BLOOD PRESSURE: 78 MMHG | BODY MASS INDEX: 36.32 KG/M2 | HEART RATE: 91 BPM | OXYGEN SATURATION: 96 % | WEIGHT: 283 LBS | RESPIRATION RATE: 20 BRPM

## 2020-02-07 PROCEDURE — 99214 OFFICE O/P EST MOD 30 MIN: CPT | Performed by: INTERNAL MEDICINE

## 2020-02-07 PROCEDURE — 3052F HG A1C>EQUAL 8.0%<EQUAL 9.0%: CPT | Performed by: INTERNAL MEDICINE

## 2020-02-07 RX ORDER — KETOCONAZOLE 20 MG/G
CREAM TOPICAL
Qty: 30 G | Refills: 1 | Status: SHIPPED | OUTPATIENT
Start: 2020-02-07

## 2020-02-07 ASSESSMENT — ENCOUNTER SYMPTOMS
STRIDOR: 0
TROUBLE SWALLOWING: 0
ANAL BLEEDING: 0
WHEEZING: 0
CHOKING: 0
BLOOD IN STOOL: 0
SORE THROAT: 0
RECTAL PAIN: 0
VOMITING: 0
ALLERGIC/IMMUNOLOGIC NEGATIVE: 1
SHORTNESS OF BREATH: 0
CONSTIPATION: 0
ABDOMINAL DISTENTION: 0
VOICE CHANGE: 0
ROS SKIN COMMENTS: MULTIPLE MOLES
DIARRHEA: 0
COUGH: 0
CHEST TIGHTNESS: 0
SINUS PRESSURE: 0
ABDOMINAL PAIN: 0
NAUSEA: 0
APNEA: 0

## 2020-02-07 ASSESSMENT — PATIENT HEALTH QUESTIONNAIRE - PHQ9
DEPRESSION UNABLE TO ASSESS: FUNCTIONAL CAPACITY MOTIVATION LIMITS ACCURACY
SUM OF ALL RESPONSES TO PHQ QUESTIONS 1-9: 0
SUM OF ALL RESPONSES TO PHQ QUESTIONS 1-9: 0
SUM OF ALL RESPONSES TO PHQ9 QUESTIONS 1 & 2: 0
1. LITTLE INTEREST OR PLEASURE IN DOING THINGS: 0
2. FEELING DOWN, DEPRESSED OR HOPELESS: 0

## 2020-02-07 NOTE — PROGRESS NOTES
2020     Jonh Garrido (:  1950) is a 78 y.o. male, here for evaluation of the following medical concerns:    HPI   Diagnosis Orders   1. Type 2 diabetes mellitus with complication, without long-term current use of insulin ([address] present for years and no hypoglycemia. No polydipsia or polyuria. Feels well. Recent a1c 8.1 2020 with endocrinology. Patient is on regimen of metformin 1000 mg bid , tradjenta 5 mg qd and ozempic increased to 5 mg q week. Tolerating well. No complaint of hypoglycemi    2. Tinea pedis of both feet in between fourth and fifth toenails bilaterally with no breach of skin barrier noted on exam. Patient given instructions to dry feet very well between the toes after shower and apply Nizoral cream daily. 3. CASTRO (nonalcoholic steatohepatitis) not symptomatic. Discussed importance of weight loss and low-fat low-carb diet. As had negative hepatitis B and negative hepatitis C antibodies in the past. No itching or fatigue. 4. Abnormality of rectum noted per CT referral given but one patient called ED WH[facility]. he was up-to-date will colonoscopy an appointment was not made. Explain to patient he is going not for another colonoscopy but to have the rectum evaluated.      5. Gouty arthritis with no flareups on allopurinol and no side effects with allopurinol         Component Name  2020 2019 2019 2019 2018 10/22/2018 2018 2018 2018 2018 2017 2017 2017 2017 2017 7/10/2017 7/10/2017 3/23/2017 3/23/2017 3/23/2017 3/23/2017 2/10/2017 2016 2016 2016 2015 3/19/2015 3/19/2015 3/19/2015 3/19/2015 2013 2013 2013 2013 2013 2013 2012 2012 2012 2012 2012 2012         141 140   141   140 139 141   139     140   142       140     139 139 136       139     139     141   138 140 137 140         4.2 4.1   3.9   3.8 3.8 4.6   4.2     4.3   4.2       3.9     4.2 4.6 4.3       4.1     4.2     4.1   4.5 5.2 (H) 4.2 4.6         104 105   104   104 104 104   104     104   105       107     106 105 105       106     107     107   104 107 105 106         27 25   29   27 27 28   27     28   27       26     24 25 23       25     22     26   28 27 23 23         10 10   8   9 8 9   8     8   10       7     9 9 8       8     10     8   6 (L) 6 (L) 9 11         15 18   12   14 13 10   13     17   12       15     12 13 15       13     9     13   14 16 14 11         1.35 (H) 1.38 (H)   1.43 (H)   1.52 (H) 1.50 (H) 1.34 (H)   1.37 (H)     1.32 (H)   1.36 (H)       1.44 (H)     1.37 (H) 1.44 (H) 1.49 (H)       1.34 (H)     1.39 (H)     1.55 (H)   1.42 (H) 1.44 (H) 1.23 1.34 (H)         140 (H) 152 (H)   163 (H)   149 (H) 148 (H) 189 (H)   150 (H)     133 (H)   139 (H)       155 (H)     118 (H) 119 (H) 98       103 (H)     111 (H)     101 (H)   99 120 (H) 100 (H) 132 (H)         10.2 10.4   10.0   9.9 9.9 10.2   10. 3     9.9   10.0       9.9     9.8 10.0 10.2       9.7     9.9     10.0   9.7 9.9 9.1 9.7         11 13   8   9 9 7   9     13   9       10     9 9 10       10     6     8   10 11 11 8         63 62   59   55 56 64   63     65   63       59     63 59 57       65     62     55   61 60 72 65         52 51   49   46 47 53   52     54   52       49     52 49 47       53     51     46   50 50 60 54                                                                           0.65   0.70 0.69 0.81 0.75       0.5 0.4       0.4 0.4       0.5         0.4       0.3 0.5   0.4 0.5 0.7       0.3           0.5   0.6 0.5           53 (H) 50 (H)       53 (H) 58 (H)       130 (H)         61 (H)       38 39   30 32 41 (H)       36           43 (H)   38 39           61 (H) 62 (H)       68 (H) 68 (H)       111 (H)         77 (H)       41 48   39 36 39       40           31   41 35           87 94       94 79       84         82       88 79   78 79 69       73           91   90 93           Urinary bladder is totally decompressed, prostate gland central calcifications incidentally noted    Signed By: Robert Thomas       a1c 8.1 Jan of 2020. Patient Active Problem List   Diagnosis    Type 2 diabetes mellitus with complication, without long-term current use of insulin ([facility] [address]    CAD (coronary artery disease)    Polyuria    Hyperlipidemia    Essential hypertension    Severe carpal tunnel syndrome of left wrist    Gouty arthritis     Allergies   Allergen Reactions    Crestor [Rosuvastatin Calcium] Other (See Comments)     Muscle pain and weakness.  Lipitor [Atorvastatin] Other (See Comments)     Muscle pain and weakness    Rosuvastatin Other (See Comments)     changes personality/ crazy dreams. Review of Systems   Constitutional: Negative. Negative for fatigue. HENT: Negative for dental problem, sinus pressure, sneezing, sore throat, tinnitus, trouble swallowing and voice change. All teeth removed   Eyes:        Readers   Respiratory: Negative for apnea, cough, choking, chest tightness, shortness of breath, wheezing and stridor. Cardiovascular: Negative for chest pain and palpitations. Heart attack and only had heart burn with no shortness of breath. Gastrointestinal: Negative for abdominal distention, abdominal pain, anal bleeding, blood in stool, constipation, diarrhea, nausea, rectal pain and vomiting. Endocrine: Negative for cold intolerance, heat intolerance, polydipsia, polyphagia and polyuria. Type 2 diabetes since 2012. Genitourinary: Negative for frequency. Bph history of kidney stones. rosa m Orr 10/2019 at [facility]. Musculoskeletal: Negative for neck pain. Lumbar spondylosis and epidura in 2007 and 2016. Decreased pain and swelling in left hand and grasp is back to normal.   Skin: Negative for pallor. Multiple moles   Allergic/Immunologic: Negative.     Neurological: Negative for dizziness, tremors, seizures, syncope, facial asymmetry, speech difficulty, weakness, light-headedness, numbness and headaches. Hematological: Negative for adenopathy. Does not bruise/bleed easily. Psychiatric/Behavioral: Negative. Negative for confusion. The patient is not nervous/anxious. Prior to Visit Medications    Medication Sig Taking? Authorizing Provider   ketoconazole (NIZORAL) 2 % cream Apply topically daily, rub in between toes daily. Yes Sherry Mireles MD   OZEMPIC, 0.25 OR 0.5 MG/DOSE, 2 MG/1.5ML SOPN once a week Yes Historical Provider, MD   tadalafil (CIALIS) 5 MG tablet Take 1 tablet by mouth daily as needed Yes Historical Provider, MD   Semaglutide,0.25 or 0.5MG/DOS, (OZEMPIC, 0.25 OR 0.5 MG/DOSE,) 2 MG/1.5ML SOPN Inject 0.5 mg into the skin once a week Yes Sherry Mireles MD   allopurinol (ZYLOPRIM) 100 MG tablet Take 1 tablet by mouth 2 times daily Take regularly to prevent gout. Yes Sherry Mireles MD   colchicine (COLCRYS) 0.6 MG tablet Take 1 tablet by mouth daily as needed for Pain For pain from inflammation Yes Sherry Mireles MD   metFORMIN (GLUCOPHAGE) 500 MG tablet Take 2 tablets by mouth 2 times daily (with meals) Yes Sherry Mireles MD   Cholecalciferol (VITAMIN D3) 1000 units CAPS Take 2,000 Units by mouth daily Yes Sherry Mireles MD   linagliptin (TRADJENTA) 5 MG tablet Take 1 tablet by mouth daily Yes Sherry Mireles MD   finasteride (PROSCAR) 5 MG tablet Take 1 tablet by mouth daily Yes Sherry Mireles MD   alirocumab (PRALUENT) 75 MG/ML SOSY injection prefilled syringe Inject 1 mL into the skin every 14 days Yes Sherry Mireles MD   lisinopril (PRINIVIL;ZESTRIL) 10 MG tablet Take 1 tablet by mouth daily Yes Sherry Mireles MD   carvedilol (COREG) 12.5 MG tablet Take 12.5 mg by mouth 2 times daily (with meals). Yes Historical Provider, MD   tamsulosin (FLOMAX) 0.4 MG capsule Take 0.4 mg by mouth daily.  Yes Historical Provider, MD aspirin 81 MG tablet Take 81 mg by mouth daily. Yes Historical Provider, MD   therapeutic multivitamin-minerals (THERAGRAN-M) tablet Take 1 tablet by mouth daily. Yes Historical Provider, MD        Social History     Tobacco Use    Smoking status: Former Smoker     Packs/day: 1.50     Years: 47.00     Pack years: 70.50     Last attempt to quit: 2002     Years since quittin.0    Smokeless tobacco: Never Used   Substance Use Topics    Alcohol use: Yes        Vitals:    20 0836   BP: 132/78   Site: Left Upper Arm   Position: Sitting   Cuff Size: Large Adult   Pulse: 91   Resp: 20   Temp: 97.5 °F (36.4 °C)   TempSrc: Oral   SpO2: 96%   Weight: 283 lb (128.4 kg)   Height: 6' 2\" (1.88 m)     Estimated body mass index is 36.34 kg/m² as calculated from the following:    Height as of this encounter: 6' 2\" (1.88 m). Weight as of this encounter: 283 lb (128.4 kg). Physical Exam  Constitutional:       General: He is not in acute distress. Appearance: He is well-developed. He is not diaphoretic. HENT:      Head: Normocephalic and atraumatic. Right Ear: External ear normal.      Left Ear: External ear normal.      Nose: Nose normal.      Mouth/Throat:      Pharynx: No oropharyngeal exudate. Eyes:      General: No scleral icterus. Right eye: No discharge. Left eye: No discharge. Conjunctiva/sclera: Conjunctivae normal.      Pupils: Pupils are equal, round, and reactive to light. Neck:      Musculoskeletal: Normal range of motion and neck supple. Thyroid: No thyromegaly. Vascular: No JVD. Trachea: No tracheal deviation. Cardiovascular:      Rate and Rhythm: Normal rate and regular rhythm. Heart sounds: Normal heart sounds. No murmur. No gallop. Pulmonary:      Effort: Pulmonary effort is normal. No respiratory distress. Breath sounds: Normal breath sounds. No wheezing or rales. Chest:      Chest wall: No tenderness.    Abdominal: Continue. 6.      NO gouty flare ups on allopurinol 100 mg bid. An electronic signature was used to authenticate this note.     --Dessa Schlatter, MD on 2/7/2020 at 9:30 AM

## 2020-02-11 ASSESSMENT — PATIENT HEALTH QUESTIONNAIRE - PHQ9
1. LITTLE INTEREST OR PLEASURE IN DOING THINGS: 0
SUM OF ALL RESPONSES TO PHQ QUESTIONS 1-9: 0
SUM OF ALL RESPONSES TO PHQ QUESTIONS 1-9: 0
2. FEELING DOWN, DEPRESSED OR HOPELESS: 0
SUM OF ALL RESPONSES TO PHQ9 QUESTIONS 1 & 2: 0

## 2020-03-10 ENCOUNTER — TELEPHONE (OUTPATIENT)
Dept: PRIMARY CARE CLINIC | Age: 70
End: 2020-03-10

## 2020-03-10 NOTE — TELEPHONE ENCOUNTER
Tiff MARAVILLA(Rn) at [facility] called to inform you that patient had hit his head at work and had to received two stitches in the back of his head. ER physician instructed patient to follow up with provider in ten days. Would like to know if you would follow up with patient and remove staples from head?  Please advise

## 2020-03-10 NOTE — TELEPHONE ENCOUNTER
NEENAM for Janice Hightower nurse at [facility] to return call back to office to schedule appt for patient follow up after hospital ok to schedule per Dr Cage Romeo

## 2020-03-12 NOTE — TELEPHONE ENCOUNTER
LUIS asked for a return call to follow up with Tiff MCLEANRn) at [place] to schedule patient for a follow up call following ER visit

## 2020-09-24 ENCOUNTER — TELEPHONE (OUTPATIENT)
Dept: PRIMARY CARE CLINIC | Age: 70
End: 2020-09-24

## 2020-09-24 NOTE — TELEPHONE ENCOUNTER
----- Message from Audrey Espinal sent at 9/24/2020 12:38 PM EDT -----  Subject: Message to Provider    QUESTIONS  Information for Provider? Betty Mcneil called in- requesting in person   visits/ check up for herself and her . Message sent to provider for   [facility] as well. [facility] has questions about her blood pressure.   ---------------------------------------------------------------------------  --------------  CALL BACK INFO  What is the best way for the office to contact you? OK to leave message on   voicemail  Preferred Call Back Phone Number? [phone]  ---------------------------------------------------------------------------  --------------  SCRIPT ANSWERS  Relationship to Patient? Other  Representative Name? wife   Betty Mcneil  Is the Representative on the appropriate HIPAA document in Epic?  Yes

## 2020-10-06 ENCOUNTER — OFFICE VISIT (OUTPATIENT)
Dept: PRIMARY CARE CLINIC | Age: 70
End: 2020-10-06
Payer: COMMERCIAL

## 2020-10-06 VITALS
BODY MASS INDEX: 36.19 KG/M2 | OXYGEN SATURATION: 99 % | HEIGHT: 74 IN | SYSTOLIC BLOOD PRESSURE: 136 MMHG | HEART RATE: 84 BPM | TEMPERATURE: 98.1 F | DIASTOLIC BLOOD PRESSURE: 74 MMHG | WEIGHT: 282 LBS

## 2020-10-06 PROBLEM — E66.01 MORBIDLY OBESE (HCC): Status: ACTIVE | Noted: 2020-10-06

## 2020-10-06 PROCEDURE — 3052F HG A1C>EQUAL 8.0%<EQUAL 9.0%: CPT | Performed by: INTERNAL MEDICINE

## 2020-10-06 PROCEDURE — 99214 OFFICE O/P EST MOD 30 MIN: CPT | Performed by: INTERNAL MEDICINE

## 2020-10-06 RX ORDER — COLCHICINE 0.6 MG/1
0.6 TABLET ORAL DAILY PRN
Qty: 30 TABLET | Refills: 2 | Status: SHIPPED | OUTPATIENT
Start: 2020-10-06

## 2020-10-06 NOTE — LETTER
[facility]  [id number]  Phone: [phone]  Fax: [phone]    Ej Mueller MD        October 6, 2020     Patient: Jose Genao   YOB: 1950   Date of Visit: 10/6/2020       To Whom It May Concern:    Please give Garcia Arm  Pneumovax 23 and shingles vaccine. Immunization History   Administered Date(s) Administered    Influenza Vaccine, unspecified formulation 10/10/2012, 10/15/2015    Influenza, High Dose (Fluzone 65 yrs and older) 10/12/2018    Influenza, Triv, 3 Years and older, IM (Afluria (5 yrs and older) 10/11/2019    Pneumococcal Polysaccharide ([id number]) 06/08/2015    Tdap (Boostrix, Adacel) 09/19/2012    Varicella (Varivax) 09/19/2012    Zoster Live (Zostavax) 09/19/2012     . If you have any questions or concerns, please don't hesitate to call.     Sincerely,          Ej Mueller MD

## 2020-10-06 NOTE — PROGRESS NOTES
10/6/2020     Jose Genao (:  1950) is a 69 y.o. male, here for evaluation of the following medical concerns:    HPI     Right hip pain intermittently for a few months. As not noticed any swelling and no history of falls of injury. Pain only with weight-bearing. Is not had any x-rays. Type II diabetes followed by endocrinology. Last A1c in January was 8.1. Has not been recently due to coronavirus. No polydipsia or polyuria. Feeling well. Currently taking Ozempic, metformin 1000 mg twice a day and diet. Hyperlipidemia with history of MI taking praluent  Patient Active Problem List   Diagnosis    Type 2 diabetes mellitus with complication, without long-term current use of insulin ([facility] [address]    CAD (coronary artery disease)    Polyuria    Hyperlipidemia    Essential hypertension    Severe carpal tunnel syndrome of left wrist    Gouty arthritis    Morbidly obese (HCC)     Allergies   Allergen Reactions    Crestor [Rosuvastatin Calcium] Other (See Comments)     Muscle pain and weakness.  Lipitor [Atorvastatin] Other (See Comments)     Muscle pain and weakness    Rosuvastatin Other (See Comments)     changes personality/ crazy dreams. Review of Systems   Constitutional: Negative. Negative for fatigue. HENT: Negative for dental problem, sinus pressure, sneezing, sore throat, tinnitus, trouble swallowing and voice change. All teeth removed   Eyes:        Readers   Respiratory: Negative for apnea, cough, choking, chest tightness, shortness of breath, wheezing and stridor. Cardiovascular: Negative for chest pain and palpitations. History of heart attack and only had heart burn with no shortness of breath. Hypertension   Gastrointestinal: Negative for abdominal distention, abdominal pain, anal bleeding, blood in stool, constipation, diarrhea, nausea, rectal pain and vomiting.    Endocrine: Negative for cold intolerance, heat intolerance, polydipsia, polyphagia and polyuria. Type 2 diabetes since 2012. Hyperlipidemia   Genitourinary: Negative for frequency. Bph history of kidney stones. rosa m Owusu 10/2019 at [facility]. Musculoskeletal: Negative for neck pain. Lumbar spondylosis and epidura in 2007 and 2016. Gouty arthritis. Skin: Negative for pallor. Multiple moles   Allergic/Immunologic: Negative. Neurological: Negative for dizziness, tremors, seizures, syncope, facial asymmetry, speech difficulty, weakness, light-headedness, numbness and headaches. Hematological: Negative for adenopathy. Does not bruise/bleed easily. Psychiatric/Behavioral: Negative. Negative for confusion. The patient is not nervous/anxious. Prior to Visit Medications    Medication Sig Taking? Authorizing Provider   ketoconazole (NIZORAL) 2 % cream Apply topically daily, rub in between toes daily. Yes Elina Alvarado MD   OZEMPIC, 0.25 OR 0.5 MG/DOSE, 2 MG/1.5ML SOPN once a week Yes Historical Provider, MD   tadalafil (CIALIS) 5 MG tablet Take 1 tablet by mouth daily as needed Yes Historical Provider, MD   Semaglutide,0.25 or 0.5MG/DOS, (OZEMPIC, 0.25 OR 0.5 MG/DOSE,) 2 MG/1.5ML SOPN Inject 0.5 mg into the skin once a week Yes Elina Alvarado MD   allopurinol (ZYLOPRIM) 100 MG tablet Take 1 tablet by mouth 2 times daily Take regularly to prevent gout.  Yes Elina Alvarado MD   colchicine (COLCRYS) 0.6 MG tablet Take 1 tablet by mouth daily as needed for Pain For pain from inflammation Yes Elina Alvarado MD   metFORMIN (GLUCOPHAGE) 500 MG tablet Take 2 tablets by mouth 2 times daily (with meals) Yes Elina Alvarado MD   Cholecalciferol (VITAMIN D3) 1000 units CAPS Take 2,000 Units by mouth daily Yes Elina Alvarado MD   linagliptin (TRADJENTA) 5 MG tablet Take 1 tablet by mouth daily Yes Elina Alvarado MD   finasteride (PROSCAR) 5 MG tablet Take 1 tablet by mouth daily Yes Elina Alvarado MD   alirocumab (PRALUENT) 75 MG/ML SOSY injection prefilled syringe Inject 1 mL into the skin every 14 days Yes Dalton Yao MD   lisinopril (PRINIVIL;ZESTRIL) 10 MG tablet Take 1 tablet by mouth daily Yes Dalton Yao MD   carvedilol (COREG) 12.5 MG tablet Take 12.5 mg by mouth 2 times daily (with meals). Yes Historical Provider, MD   tamsulosin (FLOMAX) 0.4 MG capsule Take 0.4 mg by mouth daily. Yes Historical Provider, MD   aspirin 81 MG tablet Take 81 mg by mouth daily. Yes Historical Provider, MD   therapeutic multivitamin-minerals (THERAGRAN-M) tablet Take 1 tablet by mouth daily. Yes Historical Provider, MD        Social History     Tobacco Use    Smoking status: Former Smoker     Packs/day: 1.50     Years: 47.00     Pack years: 70.50     Last attempt to quit: 2002     Years since quittin.6    Smokeless tobacco: Never Used   Substance Use Topics    Alcohol use: Yes        Vitals:    10/06/20 1126   BP: 136/74   Pulse: 84   Temp: 98.1 °F (36.7 °C)   SpO2: 99%   Weight: 282 lb (127.9 kg)   Height: 6' 2\" (1.88 m)     Estimated body mass index is 36.21 kg/m² as calculated from the following:    Height as of this encounter: 6' 2\" (1.88 m). Weight as of this encounter: 282 lb (127.9 kg). Physical Exam  Constitutional:       General: He is not in acute distress. Appearance: He is well-developed. He is not diaphoretic. HENT:      Head: Normocephalic and atraumatic. Right Ear: External ear normal.      Left Ear: External ear normal.      Nose: Nose normal.      Mouth/Throat:      Pharynx: No oropharyngeal exudate. Eyes:      General: No scleral icterus. Right eye: No discharge. Left eye: No discharge. Conjunctiva/sclera: Conjunctivae normal.      Pupils: Pupils are equal, round, and reactive to light. Neck:      Musculoskeletal: Normal range of motion and neck supple. Thyroid: No thyromegaly. Vascular: No JVD. Trachea: No tracheal deviation. Cardiovascular:      Rate and Rhythm: Normal rate and regular rhythm. Heart sounds: Normal heart sounds. No murmur. No gallop. Pulmonary:      Effort: Pulmonary effort is normal. No respiratory distress. Breath sounds: Normal breath sounds. No wheezing or rales. Chest:      Chest wall: No tenderness. Abdominal:      General: There is no distension. Palpations: Abdomen is soft. There is no mass. Tenderness: There is no abdominal tenderness. There is no guarding or rebound. Musculoskeletal: Normal range of motion. General: No tenderness. Comments: Pain in right hip with external rotation. Lymphadenopathy:      Cervical: No cervical adenopathy. Skin:     General: Skin is warm. Coloration: Skin is not pale. Findings: No erythema or rash. Comments: Tinea pedis in between fourth and fifth toes bilaterally. No breach of skin barrier. Neurological:      Mental Status: He is alert and oriented to person, place, and time. Cranial Nerves: No cranial nerve deficit. Motor: No abnormal muscle tone. Coordination: Coordination normal.      Deep Tendon Reflexes: Reflexes normal.   Psychiatric:         Behavior: Behavior normal.         Thought Content: Thought content normal.         ASSESSMENT/PLAN:       Diagnosis Orders   1. Right hip pain consistent with OA. Not a candidate for NSAId's with high CV risk, will xray . Tylenol for pain , ortho referral if severe symptoms. XR HIP RIGHT (2-3 VIEWS)   2. Morbidly obese (HCC) , low carb diet. 3. Type 2 diabetes mellitus with complication, without long-term current use of insulin ([facility] [address] monitor labs. Hemoglobin A1C    Vitamin B12    Microalbumin / Creatinine Urine Ratio   4. Essential hypertension ,   BP Readings from Last 3 Encounters:   10/06/20 136/74   02/07/20 132/78   10/30/19 117/77   controlled on current medications, monitor labs.  TSH WITH REFLEX TO FT4    Comprehensive Metabolic Panel Urinalysis    CBC Auto Differential   5. Mixed hyperlipidemia on Praluent, monitor labs. Lipid Panel   6. Fatty liver disease, nonalcoholic work on diet , exercise and weight lss. 7. Gouty arthritis with no recent flares unless related to hip pain. Monitor uric acid level with goal 6 or less. Uric Acid   8. Vitamin D deficiency with goal 50. Vitamin D 25 Hydroxy   9. Left hand pain resolved, refill prn colchicine. colchicine (COLCRYS) 0.6 MG tablet   10. Left wrist pain  Resolved, refill prn colchicine. colchicine (COLCRYS) 0.6 MG tablet       An electronic signature was used to authenticate this note.     --Ej Mueller MD on 10/6/2020 at 12:12 PM

## 2020-10-10 ASSESSMENT — ENCOUNTER SYMPTOMS
STRIDOR: 0
BLOOD IN STOOL: 0
CHEST TIGHTNESS: 0
SINUS PRESSURE: 0
SHORTNESS OF BREATH: 0
ABDOMINAL DISTENTION: 0
APNEA: 0
WHEEZING: 0
VOMITING: 0
CHOKING: 0
COUGH: 0
ABDOMINAL PAIN: 0
ANAL BLEEDING: 0
NAUSEA: 0
VOICE CHANGE: 0
ROS SKIN COMMENTS: MULTIPLE MOLES
TROUBLE SWALLOWING: 0
DIARRHEA: 0
RECTAL PAIN: 0
ALLERGIC/IMMUNOLOGIC NEGATIVE: 1
CONSTIPATION: 0
SORE THROAT: 0

## 2020-10-15 LAB
25-HYDROXYVITAMIN D2 AND D3: 46 NG/ML (ref 30–80)
A/G RATIO: 1.5 (ref 1–2.1)
ALBUMIN SERPL-MCNC: 4.3 G/DL (ref 3.5–5)
ALP BLD-CCNC: 89 U/L (ref 33–140)
ALT SERPL-CCNC: 48 U/L (ref 0–60)
ANION GAP SERPL CALCULATED.3IONS-SCNC: 12 MMOL/L (ref 5–13)
AST SERPL-CCNC: 48 U/L (ref 0–40)
BASOPHILS ABSOLUTE: 0 10*3/UL (ref 0–0.2)
BASOPHILS RELATIVE PERCENT: 0.3 %
BILIRUB SERPL-MCNC: 0.6 MG/DL (ref 0.2–1.2)
BUN / CREAT RATIO: 7
BUN BLDV-MCNC: 9 MG/DL (ref 7–25)
CALCIUM SERPL-MCNC: 10.2 MG/DL (ref 8.4–10.5)
CHLORIDE BLD-SCNC: 103 MMOL/L (ref 98–110)
CO2: 24 MMOL/L (ref 22–29)
CREAT SERPL-MCNC: 1.34 MG/DL (ref 0.5–1.3)
EOSINOPHILS ABSOLUTE: 0.2 10*3/UL (ref 0–0.5)
EOSINOPHILS RELATIVE PERCENT: 2 %
ESTIMATED AVERAGE GLUCOSE: 174 MG/DL (ref 68–114)
GFR AFRICAN AMERICAN: 64 SEE NOTE
GFR NON-AFRICAN AMERICAN: 53 SEE NOTE
GLOBULIN: 2.8 G/DL (ref 2–3.7)
GLUCOSE BLD-MCNC: 115 MG/DL (ref 71–99)
GRANULOCYTE ABSOLUTE COUNT: 4.7 10*3/UL (ref 1.5–7.8)
HBA1C MFR BLD: 7.7 % (ref 4–5.6)
HCT VFR BLD CALC: 47.2 % (ref 38.5–50)
HEMOGLOBIN: 15.3 G/DL (ref 13.2–17.1)
LYMPHOCYTES ABSOLUTE: 2.4 10*3/UL (ref 0.8–3.9)
LYMPHOCYTES RELATIVE PERCENT: 29.3 %
MCH RBC QN AUTO: 28.8 PG (ref 27–33)
MCHC RBC AUTO-ENTMCNC: 32.4 G/DL (ref 32–36)
MCV RBC AUTO: 89 FL (ref 80–100)
MONOCYTES ABSOLUTE: 0.9 10*3/UL (ref 0.2–0.9)
MONOCYTES RELATIVE PERCENT: 10.6 %
PDW BLD-RTO: 14.2 % (ref 11–15)
PLATELET # BLD: 313 10*3/UL (ref 140–400)
PMV BLD AUTO: 7.2 FL (ref 7.5–11.5)
POTASSIUM SERPL-SCNC: 4.3 MMOL/L (ref 3.5–5.1)
RBC # BLD: 5.31 10*6/UL (ref 4.2–5.8)
SEGMENTED NEUTROPHILS RELATIVE PERCENT: 57.8 %
SODIUM BLD-SCNC: 139 MMOL/L (ref 135–146)
TOTAL PROTEIN: 7.1 G/DL (ref 6–8)
TSH ULTRASENSITIVE: 1.73 UIU/ML (ref 0.35–4.94)
URIC ACID, SERUM: 6.2 MG/DL (ref 3.5–7.2)
VITAMIN B-12: 384 PG/ML (ref 213–816)
WBC # BLD: 8.2 10*3/UL (ref 3.8–10.8)

## 2021-01-06 ENCOUNTER — OFFICE VISIT (OUTPATIENT)
Dept: PRIMARY CARE CLINIC | Age: 71
End: 2021-01-06
Payer: COMMERCIAL

## 2021-01-06 VITALS
WEIGHT: 284 LBS | BODY MASS INDEX: 36.46 KG/M2 | HEART RATE: 81 BPM | DIASTOLIC BLOOD PRESSURE: 78 MMHG | SYSTOLIC BLOOD PRESSURE: 132 MMHG | TEMPERATURE: 96.8 F | OXYGEN SATURATION: 97 %

## 2021-01-06 DIAGNOSIS — E78.2 MIXED HYPERLIPIDEMIA: ICD-10-CM

## 2021-01-06 DIAGNOSIS — R60.0 LEG EDEMA, RIGHT: ICD-10-CM

## 2021-01-06 DIAGNOSIS — I10 ESSENTIAL HYPERTENSION: ICD-10-CM

## 2021-01-06 DIAGNOSIS — M25.561 ACUTE PAIN OF RIGHT KNEE: Primary | ICD-10-CM

## 2021-01-06 DIAGNOSIS — Z13.6 ENCOUNTER FOR ABDOMINAL AORTIC ANEURYSM SCREENING: ICD-10-CM

## 2021-01-06 DIAGNOSIS — E11.8 TYPE 2 DIABETES MELLITUS WITH COMPLICATION, WITHOUT LONG-TERM CURRENT USE OF INSULIN (HCC): ICD-10-CM

## 2021-01-06 PROCEDURE — 99213 OFFICE O/P EST LOW 20 MIN: CPT | Performed by: INTERNAL MEDICINE

## 2021-01-06 ASSESSMENT — ENCOUNTER SYMPTOMS
VOMITING: 0
VOICE CHANGE: 0
RECTAL PAIN: 0
CHEST TIGHTNESS: 0
SINUS PRESSURE: 0
ANAL BLEEDING: 0
WHEEZING: 0
STRIDOR: 0
NAUSEA: 0
CHOKING: 0
DIARRHEA: 0
CONSTIPATION: 0
SHORTNESS OF BREATH: 0
TROUBLE SWALLOWING: 0
COUGH: 0
SORE THROAT: 0
ALLERGIC/IMMUNOLOGIC NEGATIVE: 1
ABDOMINAL DISTENTION: 0
APNEA: 0
ROS SKIN COMMENTS: MULTIPLE MOLES
BLOOD IN STOOL: 0
ABDOMINAL PAIN: 0

## 2021-01-06 ASSESSMENT — PATIENT HEALTH QUESTIONNAIRE - PHQ9
2. FEELING DOWN, DEPRESSED OR HOPELESS: 0
SUM OF ALL RESPONSES TO PHQ9 QUESTIONS 1 & 2: 0

## 2021-01-06 NOTE — PROGRESS NOTES
Nivia Henao (:  1950) is a 69 y.o. male,Established patient, here for evaluation of the following chief complaint(s):  Check-Up      ASSESSMENT/PLAN:   Diagnosis Orders   1. Acute pain of right knee use ice an tylenol and follow up with Dr. Leslie Viramontes to see if a candidate for cartilage injections due to chronic oa pain. XR KNEE RIGHT (3 VIEWS)    External Referral To Orthopedic Surgery    door hit knee   2. Encounter for abdominal aortic aneurysm screening with no abdominal pain US ABDOMINAL AORTA LIMITED    screening   3. Type 2 diabetes mellitus with complication, without long-term current use of insulin ([address] controlled, has appointment with end this month for a1c and lipid. External Referral To Ophthalmology   4. Essential hypertension controlled, continue medication and stable renal function. 5. Mixed hyperlipidemia continue praulent and has appt with endo for lipid. US ABDOMINAL AORTA LIMITED   Right leg swelling will get doppler. SUBJECTIVE/OBJECTIVE:  Knee Pain   Incident onset: patient was sitting in the exam room and when opening the door hit the right knee and patient is experiencing. . Injury mechanism: door hit right knee. Patient reports knee is chronically swollen and pain intermittently with weight bearing activities at work. The pain is present in the right knee. The quality of the pain is described as aching. The pain is at a severity of 4/10. The pain is moderate. Pertinent negatives include no numbness. He reports no foreign bodies present. The symptoms are aggravated by palpation and weight bearing. He has tried nothing for the symptoms. The treatment provided no relief (patient instructed to take tylenol and ice knee, discussed options of cartilage injections. ). type 2 diabetes:    . Care Everywhere Result Report  COMPREHENSIVE METABOLIC PANELResulted:  4:04 PM  [facility]  Component Name Value Ref Range   Sodium 139 135 - 146 mmol/L   Potassium 4.3 3.5 - 5.1 mmol/L   Chloride 103 98 - 110 mmol/L   CO2 24 22 - 29 mmol/L   Anion Gap 12   Comment: Anion gap calculation does not include potassium (K+) value. 5 - 13 mmol/L   BUN 9 7 - 25 mg/dL   Creatinine 1.34 (H) 0.5 - 1.3 mg/dL   Glucose 115 (H) 71 - 99 mg/dL   GFR MDRD Af Amer 64   Comment:  GFR is estimated using Creatinine, age, gender and race. Patient's values should be interpreted as a trend.      Between 30 and 90 ml/min/1.73m2, clinical correlation is needed.     For additional information:     [email or web address] org See Note   Calcium 10.2 8.4 - 10.5 mg/dL   GFR MDRD Non Af Amer 53   Comment:  GFR is estimated using Creatinine, age, gender and race. Patient's values should be interpreted as a trend.      Between 30 and 90 ml/min/1.73m2, clinical correlation is needed.     For additional information:     [email or web address] org See Note   Total Bilirubin 0.6 0.2 - 1.2 mg/dL   AST 48 (H) 0 - 40 U/L   ALT 48 0 - 60 U/L   Alkaline Phosphatase 89 33 - 140 U/L   Total Protein 7.1 6 - 8 g/dL   Albumin 4.3 3.5 - 5 g/dL   Globulin 2.8 2 - 3.7 g/dL   Albumin/Globulin Ratio 1.5 1 - 2.1    BUN/Creatinine Ratio 7     Specimen Collected on   SERUM - Serum 10/15/2020 10:42 AM     a1c 7.3  10/15/20  It has been controlled. If misses a meal gluc decreased to 67. Will have patient carry [employer] incase misses a meal.  Felt like glucose was dropping and ate a peppermint stick and we checked glucose 187. Chronic right lower leg swelling but no pain. Review of Systems   Constitutional: Negative. Negative for fatigue. HENT: Negative for dental problem, sinus pressure, sneezing, sore throat, tinnitus, trouble swallowing and voice change. All teeth removed   Eyes:        Readers   Respiratory: Negative for apnea, cough, choking, chest tightness, shortness of breath, wheezing and stridor. Cardiovascular: Negative for chest pain and palpitations.         History of heart attack and only had heart burn with no shortness of sounds. No wheezing or rales. Chest:      Chest wall: No tenderness. Abdominal:      General: There is no distension. Palpations: Abdomen is soft. There is no mass. Tenderness: There is no abdominal tenderness. There is no guarding or rebound. Musculoskeletal: Normal range of motion. General: No tenderness. Comments: Pain in right hip with external rotation. Lymphadenopathy:      Cervical: No cervical adenopathy. Skin:     General: Skin is warm. Coloration: Skin is not pale. Findings: No erythema or rash. Comments: Tinea pedis in between fourth and fifth toes bilaterally. No breach of skin barrier. Neurological:      Mental Status: He is alert and oriented to person, place, and time. Cranial Nerves: No cranial nerve deficit. Motor: No abnormal muscle tone. Coordination: Coordination normal.      Deep Tendon Reflexes: Reflexes normal.      Comments: Sensation intact   Psychiatric:         Mood and Affect: Mood normal.         Behavior: Behavior normal.         Thought Content: Thought content normal.         Judgment: Judgment normal.           On this date 01/06/21 I have spent25 minutes reviewing previous notes, test results and face to face with the patient discussing the diagnosis and importance of compliance with the treatment plan. Patient voiced understanding to plan as per orderes. An electronic signature was used to authenticate this note.     --Griselda Geronimo MD